# Patient Record
Sex: MALE | Race: WHITE | URBAN - METROPOLITAN AREA
[De-identification: names, ages, dates, MRNs, and addresses within clinical notes are randomized per-mention and may not be internally consistent; named-entity substitution may affect disease eponyms.]

---

## 2017-01-10 ENCOUNTER — OFFICE VISIT (OUTPATIENT)
Dept: OTHER | Facility: OUTPATIENT CENTER | Age: 42
End: 2017-01-10

## 2017-01-10 DIAGNOSIS — F91.8 CONDUCT DISORDER, UNDIFFERENTIATED TYPE: Primary | ICD-10-CM

## 2017-01-10 DIAGNOSIS — F43.23 ADJUSTMENT DISORDER WITH MIXED ANXIETY AND DEPRESSED MOOD: ICD-10-CM

## 2017-01-10 NOTE — PROGRESS NOTES
"[facility] -  Case Progress Note    Date of Service: 1/10/17  Client Name: Bo Pham  YOB: 1975  MRN:  [id number]  Treating Provider: Ry Bro, PhD LP  Type of Session: Individual  Present in Session: client only  Number of Minutes:  53    Current Symptoms/Status:  Client has struggled with compulsive-type sexual behavior (CSB), which has interfered with his functioning and caused marked personal distress. He is trying to adjust to his foot injury and a marked change in his body and health. He is feeling anxious and depressed because he will have his foot amputated.      Progress Toward Treatment Goals:   No report, due to his health issues and other stressors. He has not been here since Nov.    Intervention: Modality and Description:  CBT and psychodynamic techniques were used to help explore the client's emotions and sexuality. Bo's foot surgery has been scheduled for next week. He is scared. The recovery concerns him. He does not want to feel weak or in need of help from others. Bo is concerned that his work comp will not cover parts of the surgery. He admitted that he thinks about sex and relies on it too much for coping or well-being. He plans to play with legos, read, play video games, watch movies, and eat snacks during his recovery. Bo will use arm braces and other devices. He will be alone from 8:30am to 12pm each day. He admitted that he would be interested in doing something \"sexually unhealthy\" but he thinks he will not be motivated enough. The idea of being physically active during his recovery was discussed. He might start rehab 2 weeks after the surgery. Therapist suggested that his foot situation has helped him avoid deciding what he wants with Niya and his long-term interests / plans. He agreed.       Response to Intervention:  Open. Verbal. Took feedback. Verbose. Tangential at times.      Assignment:  Maintain self-care. Use writing to " clarify his options and long-term goals. Past task: Bring Niya to the next session.    Diagnosis:  312.89 (F91.8) Other Specified Disruptive, Impulse-Control, and Conduct Disorder (sexual acting out)  309.28 Adjustment Disorder with Mixed Anxiety and Depression    Plan / Need for Future Services:  Return for therapy in 2 weeks.      Ry Bro, PhD LP

## 2017-01-12 ENCOUNTER — OFFICE VISIT - HEALTHEAST (OUTPATIENT)
Dept: FAMILY MEDICINE | Facility: CLINIC | Age: 42
End: 2017-01-12

## 2017-01-12 DIAGNOSIS — S87.82XA CRUSHING INJURY OF LEFT LOWER LEG, INITIAL ENCOUNTER: ICD-10-CM

## 2017-01-12 DIAGNOSIS — Z01.818 PREOP EXAMINATION: ICD-10-CM
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
## 2017-01-12 LAB — ALT SERPL W P-5'-P-CCNC: 16 U/L (ref 0–45)

## 2017-01-12 ASSESSMENT — MIFFLIN-ST. JEOR: SCORE: 1573.32

## 2017-01-17 ENCOUNTER — SURGERY - HEALTHEAST (OUTPATIENT)
Dept: SURGERY | Facility: CLINIC | Age: 42
End: 2017-01-17

## 2017-01-17 ENCOUNTER — ANESTHESIA - HEALTHEAST (OUTPATIENT)
Dept: SURGERY | Facility: CLINIC | Age: 42
End: 2017-01-17

## 2017-01-17 ASSESSMENT — MIFFLIN-ST. JEOR: SCORE: 1555.98

## 2017-01-19 ASSESSMENT — MIFFLIN-ST. JEOR: SCORE: 1542.94

## 2017-01-20 ASSESSMENT — MIFFLIN-ST. JEOR: SCORE: 1555.3

## 2018-07-11 ENCOUNTER — COMMUNICATION - HEALTHEAST (OUTPATIENT)
Dept: TELEHEALTH | Facility: CLINIC | Age: 43
End: 2018-07-11

## 2018-07-11 ENCOUNTER — OFFICE VISIT - HEALTHEAST (OUTPATIENT)
Dept: FAMILY MEDICINE | Facility: CLINIC | Age: 43
End: 2018-07-11

## 2018-07-11 DIAGNOSIS — H00.012 HORDEOLUM EXTERNUM OF RIGHT LOWER EYELID: ICD-10-CM

## 2019-10-05 ENCOUNTER — HEALTH MAINTENANCE LETTER (OUTPATIENT)
Age: 44
End: 2019-10-05

## 2020-11-14 ENCOUNTER — HEALTH MAINTENANCE LETTER (OUTPATIENT)
Age: 45
End: 2020-11-14

## 2021-05-30 ENCOUNTER — RECORDS - HEALTHEAST (OUTPATIENT)
Dept: ADMINISTRATIVE | Facility: CLINIC | Age: 46
End: 2021-05-30

## 2021-05-30 VITALS — WEIGHT: 162.2 LBS | HEIGHT: 67 IN | BODY MASS INDEX: 25.46 KG/M2

## 2021-05-30 VITALS — WEIGHT: 159.1 LBS | BODY MASS INDEX: 24.97 KG/M2 | HEIGHT: 67 IN

## 2021-06-01 VITALS — WEIGHT: 168.2 LBS | BODY MASS INDEX: 26.34 KG/M2

## 2021-06-08 NOTE — ANESTHESIA POSTPROCEDURE EVALUATION
Patient: Bo Pham  LEFT BELOW THE KNEE ERTL-STYLE AMPUTATION  Anesthesia type: general    Patient location: PACU  Last vitals:   Vitals:    01/17/17 1745   BP: 150/78   Pulse: 92   Resp: 16   Temp:    SpO2: 98%     Post vital signs: stable  Level of consciousness: awake and responds to simple questions  Post-anesthesia pain: pain controlled  Post-anesthesia nausea and vomiting: no  Pulmonary: unassisted, return to baseline  Cardiovascular: stable and blood pressure at baseline  Hydration: adequate  Anesthetic events: no    QCDR Measures:  ASA# 11 - [place]-op Cardiac Arrest: ASA11B - Patient did NOT experience unanticipated cardiac arrest  ASA# 12 - [place]-op Mortality Rate: ASA12B - Patient did NOT die  ASA# 13 - PACU Re-Intubation Rate: ASA13B - Patient did NOT require a new airway mgmt  ASA# 10 - Composite Anes Safety: ASA10A - No serious adverse event  ASA# 38 - New Corneal Injury: ASA38A - No new exposure keratitis or corneal abrasion in PACU    Additional Notes: Pain was described as dull 4/10 and tolerable in PACU

## 2021-06-08 NOTE — ANESTHESIA PREPROCEDURE EVALUATION
Anesthesia Evaluation      Patient summary reviewed     Airway   Mallampati: II  Neck ROM: full   Pulmonary - normal exam                          Cardiovascular   Exercise tolerance: > or = 4 METS  Rhythm: regular  Rate: normal,         Neuro/Psych    (+) depression, anxiety/panic attacks,     Endo/Other    (-) no obesity     GI/Hepatic/Renal    (+) GERD,        Other findings: Thin, fit, INAD      Dental - normal exam                        Anesthesia Plan  Planned anesthetic: general LMA and epidural  Will place epidural in Pre-op, load in OR, begin infusion in PACU  ASA 1   Induction: intravenous   Anesthetic plan and risks discussed with: patient  Anesthesia plan special considerations: antiemetics,   Post-op plan: epidural analgesia

## 2021-06-08 NOTE — ANESTHESIA POSTPROCEDURE EVALUATION
Patient: Bo Pham  LEFT BELOW THE KNEE ERTL-STYLE AMPUTATION  Anesthesia type: general    Patient location: [facility] Surgical Floor  Last vitals:   Vitals:    01/19/17 0729   BP: 150/84   Pulse: 74   Resp: 18   Temp: 37  C (98.6  F)   SpO2: 97%       Patient reports improved pain control after adjusting the epidural infusion yesterday.  He is pleased with pain control at present.  No nausea, pruritus.  Continue present rate of 10ml/hr.  Likely discontinue epidural sometime on POD 3.  Atilio Huerta MD      Additional Notes:

## 2021-06-08 NOTE — ANESTHESIA PROCEDURE NOTES
Epidural Block    Patient location during procedure: pre-op  Time Called: 1/17/2017 2:53 PM  Reason for Block:post-op pain management and at surgeon's request  Staffing:  Performing  Anesthesiologist: MOON BELTRE  Preanesthetic Checklist  Completed: patient identified, risks, benefits, and alternatives discussed, timeout performed, consent obtained, airway assessed, oxygen available, suction available, emergency drugs available and hand hygiene performed  Procedure  Patient position: sitting  Prep: ChloraPrep  Patient monitoring: continuous pulse oximetry, heart rate and blood pressure  Approach: midline  Location: L3-L4  Injection technique: SONNY saline  Number of Attempts:1  Needle  Needle type: Tuohy   Needle gauge: 18 G     Catheter in Space: 4  Assessment  Sensory level: Other (no loading at this time)  No complications    Events: Negative test dose     Additional Notes:  Secured at 11 cm at the skin

## 2021-06-08 NOTE — ANESTHESIA POST-OP FOLLOW-UP NOTE
"Patient: Bo Pham  LEFT BELOW THE KNEE ERTL-STYLE AMPUTATION  Anesthesia type: general with epidural adjunct    S:  Pt is a 42 yo M POD #1 s/p BKA of LLE.  Previous history of traumatic crush injury and complex regional pain syndrome rendering the limb non-salvageable.  He is doing well this AM and rates his pain at a 4-5/10 with phantom pains in the \"foot\".  He notes these are relieved by the PCA and positioning changes.  His RLE is mobile and has sensation on the dorsum and bottom of the foot.  He is able to engage large muscle groups below the hip flexors.  He is tolerated a PO diet and has no symptoms of LAST.  He is stable and in good spirits.    O:   Vitals:    01/18/17 0700   BP: 125/85   Pulse: 66   Resp:    Temp: 37.2  C (99  F)   SpO2: 98%     LLE dressing is C/D/I  RLE is mobile, able to freely move toes.  There is evident vasodilation in the foot indication excellent sympathectomy  Back:  Epidural sight and dressing is C/D/I, remains at 11 cm at the skin.  Infusion rate is 6mL/hr    A/P:  Will continue epidural at this time, anticipate pulling at the 72 hour harley in concordance with any anticoagulant timing.  (2 hours before prophylactic heparin dose please).  Given his ongoing sensation of L4-S1 we will increase his rate to 8 mL/he and recheck his level of analgesia later today.  Please call the anesthesia charge phone at [phone] if any issues arise.  We will continue to follow.    Ailyn Ramos M.D.  Staff Anesthesiologist  [phone]      "

## 2021-06-08 NOTE — ANESTHESIA CARE TRANSFER NOTE
Last vitals:   Vitals:    01/17/17 1728   BP: 136/82   Pulse: (!) 104   Resp: 20   Temp: 36.7  C (98.1  F)   SpO2: 100%     Patient's level of consciousness is awake  Spontaneous respirations: yes  Maintains airway independently: yes  Dentition unchanged: yes  Oropharynx: oropharynx clear of all foreign objects    QCDR Measures:  ASA# 20 - Surgical Safety Checklist: ASA20A - Safety Checks Done  PQRS# 430 - Adult PONV Prevention: 4558F - Pt received => 2 anti-emetic agents (different classes) preop & intraop  ASA# 8 - Peds PONV Prevention: NA - Not pediatric patient, not GA or 2 or more risk factors NOT present  PQRS# 424 - Of[place]-op Temp Management: 4559F - At least one body temp DOCUMENTED => 35.5C or 95.9F within required timeframe  PQRS# 426 - PACU Transfer Protocol: - Transfer of care checklist used  ASA# 14 - Acute Post-op Pain: ASA14B - Patient did NOT experience pain >= 7 out of 10    I completed my SBAR handoff to the receiving nurse per policy and procedure.

## 2021-06-08 NOTE — ANESTHESIA POSTPROCEDURE EVALUATION
Patient: Bo Pham  LEFT BELOW THE KNEE ERTL-STYLE AMPUTATION  Anesthesia type: general    Patient location: floor  Last vitals:   Vitals:    01/20/17 0423   BP: 115/71   Pulse: 62   Resp: 16   Temp: 36.8  C (98.2  F)   SpO2: 95%     Post vital signs: stable  Level of consciousness: awake and responds to simple questions  Post-anesthesia pain: pain controlled  Post-anesthesia nausea and vomiting: no  Pulmonary: unassisted, return to baseline  Cardiovascular: stable and blood pressure at baseline      Additional Notes:  POD 3, good pain control.  Epidural dc'ed , tip intact.  Plan to transition to IV/oral analgesia today.  RN informed of plan.      Nathan Perdomo MD

## 2021-06-08 NOTE — PROGRESS NOTES
Assessment/Plan:      Visit for Preoperative Exam.     Patient approved for surgery with general or local anesthesia. Postoperative pain to be managed by surgeon during post-operative Global Surgical Package timeframe, typically 30-60 days for major surgery, and less for others. Postoperative Care will be managed by Hospital Service. Labs will be done as indicated. Above recommendations were reviewed with the patient. Follow up as needed. Proceed with proposed surgery without additional clinical clarifications. No active cardiac conditions.     Blood pressure was slightly elevated on initial intake, recheck blood pressure was normal.    Subjective:     Scheduled Procedure: Left Below the Knee ERTL-Style Amputation  Surgery Date:  1/17/2017  Surgery Location:  [facility] Main OR  Surgeon:  Dr. Tin Kumar    Current Outpatient Prescriptions   Medication Sig Dispense Refill     cetirizine (ZYRTEC) 10 MG tablet Take 10 mg by mouth daily.       GABAPENTIN ORAL Take by mouth 2 (two) times a day.       OS-OMARI 500 + D3 500 mg(1,250mg) -200 unit per tablet TAKE 1 CAP BY MOUTH 3 TIMES DAILY  3     sertraline (ZOLOFT) 100 MG tablet Take 100 mg by mouth daily.       traMADol (ULTRAM) 50 mg tablet TAKE 1-2 TABLETS BY MOUTH EVERY 4-6 HOURS AS NEEDED FOR PAIN. NO MORE THAN 8 TABS/DAY  0     No current facility-administered medications for this visit.        No Known Allergies    Immunization History   Administered Date(s) Administered     Influenza, inj, historic 10/05/2016     Tdap 06/26/2013       Patient Active Problem List   Diagnosis     Anxiety     Tension-type Headache     Migraine Headache     Earache     Crushing injury of left lower leg, initial encounter       Past Medical History   Diagnosis Date     Anxiety      Arthritis      Depression        Social History     Social History     Marital status:      Spouse name: Niya     Number of children: 2     Years of education: 16     Occupational History      Demolition      Social History Main Topics     Smoking status: Former Smoker     Packs/day: 1.00     Years: 10.00     Types: Cigarettes     Quit date: 1/1/2008     Smokeless tobacco: Former User     Types: Chew     Alcohol use No     Drug use: No     Sexual activity: Yes     Partners: Female     Birth control/ protection: Surgical     Other Topics Concern     Not on file     Social History Narrative       Past Surgical History   Procedure Laterality Date     Leg surgery Left 03/28/2016     [place] tooth extraction Bilateral 1995     Wrist surgery Right 1996     Anterior cruciate ligament repair Left 1996     Vasectomy Bilateral 2013       History of Present Illness  41-year-old male with a work-related crush injury to the lower left foot and ankle on 3/28/2016.  Patient has had multiple surgeries since that timeframe however not able to maintain adequate blood flow and significant destruction of the muscles.  This is causing continuous pain as well as nonfunctioning of the lower limb.  Said failure of conservative therapy and even some surgical therapies.  As such decision for amputation below the knee was determined to be the best long-standing option for him.    Recent Health  Fever: no  Chills: no  Fatigue: no  Chest Pain: no  Cough: no  Dyspnea: no  Urinary Frequency: no  Nausea: yes, states it is stress induced due to pending surgery  Vomiting: no  Diarrhea: no  Abdominal Pain: no  Easy Bruising: no  Lower Extremity Swelling: no  Poor Exercise Tolerance: no    Most recent Health Maintenance Visit:  4 month(s) ago    Pertinent History  Prior Anesthesia: yes  Previous Anesthesia Reaction:  no  Diabetes: no  Cardiovascular Disease: no  Pulmonary Disease: no  Renal Disease: no  GI Disease: no  Sleep Apnea: no  Thromboembolic Problems: no  Clotting Disorder: no  Bleeding Disorder: no  Transfusion Reaction: no  Impaired Immunity: no  Steroid use in the last 6 months: no  Frequent Aspirin use: no    Family history of  "No family history of clotting or bleeding disorders    Social history of patient does not wear denture or partial plates, there is no transfusion refusal and there are no concerns regarding care after surgery    After surgery, the patient plans to recover at home with family.    Review of Systems  Review of Systems   All other systems reviewed and are negative.            Objective:         Vitals:    01/12/17 0929 01/12/17 1012   BP: (!) 126/100 118/84   Pulse: 84    Resp: 12    Temp: 98.4  F (36.9  C)    TempSrc: Oral    Weight: 162 lb 3.2 oz (73.6 kg)    Height: 5' 7.25\" (1.708 m)        Physical Exam:  Physical Exam   Constitutional: He is oriented to person, place, and time. He appears well-developed and well-nourished.   HENT:   Head: Normocephalic and atraumatic.   Right Ear: External ear normal.   Left Ear: External ear normal.   Nose: Nose normal.   Mouth/Throat: Oropharynx is clear and moist.   Eyes: Conjunctivae and EOM are normal. Pupils are equal, round, and reactive to light.   Neck: Normal range of motion.   Cardiovascular: Normal rate, regular rhythm and normal heart sounds.    Pulmonary/Chest: Effort normal and breath sounds normal.   Abdominal: Soft.   Musculoskeletal:   Strength normal throughout the full right lower extremity, normal from the knee proximal on the left.   Neurological: He is alert and oriented to person, place, and time. He has normal strength. No cranial nerve deficit.   Reflex Scores:       Bicep reflexes are 2+ on the right side and 2+ on the left side.       Patellar reflexes are 2+ on the right side and 2+ on the left side.  Skin: Skin is warm and dry.   Psychiatric: He has a normal mood and affect. His behavior is normal. Judgment and thought content normal.        Recent Results (from the past 168 hour(s))   Basic Metabolic Panel   Result Value Ref Range    Sodium 140 136 - 145 mmol/L    Potassium 4.7 3.5 - 5.0 mmol/L    Chloride 103 98 - 107 mmol/L    CO2 29 22 - 31 mmol/L "    Anion Gap, Calculation 8 5 - 18 mmol/L    Glucose 89 70 - 125 mg/dL    Calcium 10.3 8.5 - 10.5 mg/dL    BUN 23 (H) 8 - 22 mg/dL    Creatinine 1.02 0.70 - 1.30 mg/dL    GFR MDRD Af Amer >60 >60 mL/min/1.73m2    GFR MDRD Non Af Amer >60 >60 mL/min/1.73m2   ALT (SGPT)   Result Value Ref Range    ALT 16 0 - 45 U/L   HM2(CBC w/o Differential)   Result Value Ref Range    WBC 8.0 4.0 - 11.0 thou/uL    RBC 5.32 4.40 - 6.20 mill/uL    Hemoglobin 15.7 14.0 - 18.0 g/dL    Hematocrit 48.0 40.0 - 54.0 %    MCV 90 80 - 100 fL    MCH 29.6 27.0 - 34.0 pg    MCHC 32.7 32.0 - 36.0 g/dL    RDW 11.8 11.0 - 14.5 %    Platelets 260 140 - 440 thou/uL    MPV 7.2 7.0 - 10.0 fL

## 2021-06-15 PROBLEM — Z89.512 STATUS POST BELOW-KNEE AMPUTATION OF LEFT LOWER EXTREMITY (H): Status: ACTIVE | Noted: 2017-01-17

## 2021-06-15 PROBLEM — F43.10 PTSD (POST-TRAUMATIC STRESS DISORDER): Chronic | Status: ACTIVE | Noted: 2017-01-18

## 2021-06-19 NOTE — PROGRESS NOTES
Assessment:     1. Hordeolum externum of right lower eyelid         Plan:     I discussed the diagnosis with the patient recommendations for hot packing 2-3 times per day.  If the symptoms worsen despite this or does not resolve after couple of weeks he should contact me as well.    Subjective:       42 y.o. male presents for evaluation of right eye symptoms of about 3-4 days duration.  The patient reports the last couple days awakening with quite a bit of puffiness under his right eye.  He notes a lump like feeling involving the right lower eyelid.  He has some mild discomfort.      Reviewed: The following portions of the patient's history were reviewed and updated as appropriate: allergies, current medications, past family history, past medical history, past social history, past surgical history and problem list.    Review of Systems  Pertinent items are noted in HPI.       Objective:     /74 (Patient Site: Left Arm, Patient Position: Sitting, Cuff Size: Adult Regular)  Pulse 64  Wt 168 lb 3.2 oz (76.3 kg)  BMI 26.34 kg/m2  General appearance: alert, appears stated age and cooperative  Eyes: small swelling at lower eyelid, mid position. Slightly red. Mild puffiness below right eyelid around cheek.       This note has been dictated using voice recognition software. Any grammatical or context distortions are unintentional and inherent to the software

## 2021-07-03 NOTE — ADDENDUM NOTE
Addendum Note by Ailyn Ramos MD at 2/22/2017  7:22 AM     Author: Ailyn Ramos MD Service: -- Author Type: Physician    Filed: 2/22/2017  7:22 AM Date of Service: 2/22/2017  7:22 AM Status: Signed    : Ailyn Ramos MD (Physician)       Addendum  created 02/22/17 0722 by Ailyn Ramos MD    Anesthesia Intra Blocks edited

## 2021-09-12 ENCOUNTER — HEALTH MAINTENANCE LETTER (OUTPATIENT)
Age: 46
End: 2021-09-12

## 2022-01-02 ENCOUNTER — HEALTH MAINTENANCE LETTER (OUTPATIENT)
Age: 47
End: 2022-01-02

## 2022-11-19 ENCOUNTER — HEALTH MAINTENANCE LETTER (OUTPATIENT)
Age: 47
End: 2022-11-19

## 2023-04-09 ENCOUNTER — HEALTH MAINTENANCE LETTER (OUTPATIENT)
Age: 48
End: 2023-04-09

## 2023-06-21 ENCOUNTER — LAB REQUISITION (OUTPATIENT)
Dept: LAB | Facility: CLINIC | Age: 48
End: 2023-06-21

## 2023-06-21 PROCEDURE — 84999 UNLISTED CHEMISTRY PROCEDURE: CPT

## 2023-06-21 PROCEDURE — 83945 ASSAY OF OXALATE: CPT

## 2023-06-21 PROCEDURE — 82507 ASSAY OF CITRATE: CPT

## 2023-06-22 LAB
Lab: NORMAL
Lab: NORMAL
PERFORMING LABORATORY: NORMAL
PERFORMING LABORATORY: NORMAL
SPECIMEN STATUS: NORMAL
SPECIMEN STATUS: NORMAL
TEST NAME: NORMAL
TEST NAME: NORMAL

## 2023-06-23 LAB — MISCELLANEOUS TEST 1 (ARUP): ABNORMAL

## 2023-06-26 LAB — MISCELLANEOUS TEST 1 (ARUP): NORMAL

## 2024-02-16 ENCOUNTER — LAB REQUISITION (OUTPATIENT)
Dept: LAB | Facility: CLINIC | Age: 49
End: 2024-02-16

## 2024-02-17 LAB — CMV DNA SPEC NAA+PROBE-ACNC: NOT DETECTED IU/ML
